# Patient Record
Sex: MALE | Race: WHITE | Employment: OTHER | URBAN - METROPOLITAN AREA
[De-identification: names, ages, dates, MRNs, and addresses within clinical notes are randomized per-mention and may not be internally consistent; named-entity substitution may affect disease eponyms.]

---

## 2017-08-16 ENCOUNTER — TELEPHONE (OUTPATIENT)
Dept: ORTHOPEDIC SURGERY | Facility: CLINIC | Age: 62
End: 2017-08-16

## 2017-08-16 ENCOUNTER — OFFICE VISIT (OUTPATIENT)
Dept: ORTHOPEDIC SURGERY | Facility: CLINIC | Age: 62
End: 2017-08-16

## 2017-08-16 VITALS
WEIGHT: 181 LBS | HEART RATE: 58 BPM | HEIGHT: 70 IN | OXYGEN SATURATION: 100 % | SYSTOLIC BLOOD PRESSURE: 138 MMHG | BODY MASS INDEX: 25.91 KG/M2 | DIASTOLIC BLOOD PRESSURE: 95 MMHG | TEMPERATURE: 95.4 F

## 2017-08-16 DIAGNOSIS — I70.90 CALCIFICATION OF MULTIPLE JOINTS AND ARTERIES: ICD-10-CM

## 2017-08-16 DIAGNOSIS — G89.29 CHRONIC RIGHT HIP PAIN: Primary | ICD-10-CM

## 2017-08-16 DIAGNOSIS — M25.551 CHRONIC RIGHT HIP PAIN: Primary | ICD-10-CM

## 2017-08-16 DIAGNOSIS — M25.80 CALCIFICATION OF MULTIPLE JOINTS AND ARTERIES: ICD-10-CM

## 2017-08-16 RX ORDER — CELECOXIB 200 MG/1
CAPSULE ORAL 2 TIMES DAILY
COMMUNITY

## 2017-08-16 RX ORDER — ROSUVASTATIN CALCIUM 5 MG/1
TABLET, COATED ORAL
COMMUNITY

## 2017-08-16 NOTE — PROGRESS NOTES
Patient: Laverne Carreon                MRN: [id number]       SSN: [id number]  YOB: 1955        AGE: 63 y.o. SEX: male  Body mass index is 25.97 kg/(m^2). PCP: No primary care provider on file. 08/16/17    HISTORY: Mr. Live Rasmussen is a retired  with end-staged arthritis involving the right hip. He is interested in a direct anterior hip replacement. He would like to run on his hip. We had a lengthy discussion. I do not recommend running on a hip replacement for implant longevity purposes. The pain is significant. He has less than a 10-minute level walking tolerance. He has pain at night and pain with activities of daily living. He is fed up and frustrated with it. He is interviewing a couple of different surgeons to make his decision, and he is interested in a direct anterior hip replacement. He likes to body build and work out. He is very active. PHYSICAL EXAMINATION:  On examination today, he walks with an antalgic gait. He holds his back a little stiffly. The left hip rotates nicely. The right hip is quite stiff, which reproduces his groin pain. The calf is nontender. Omi's sign is negative. Both feet are warm and well-perfused. There is no cyanosis or clubbing. He does have good pulse. RADIOGRAPHS:  On the x-ray, he has calcification of his arteries, and he also gives a history of two DVTs and two PEs. PLAN:  He is on Xarelto. He may be a candidate for a filter, and with his calcification in his arteries as well, and I would like him to have a vascular opinion from the point of view of the calcification in his arteries. We will get one. We did discuss risks and benefits involving joint replacement including but not limited to infection, DVT, pulmonary embolism, anesthetic complications, blood loss requiring transfusion, pain, stiffness, as well as implant longevity, leg length discrepancy, dislocation, and other complications.   All questions were answered. He is going to think about things. I am going to get a Vascular opinion, and he will return to see us. We can discuss a little bit more when he returns. REVIEW OF SYSTEMS:      CON: negative for weight loss, fever  EYE: negative for double vision  ENT: negative for hoarseness  RS:   negative for Tb  GI:    negative for blood in stool  :  negative for blood in urine  Other systems reviewed and noted below. Past Medical History:   Diagnosis Date    Arthritis     Cancer [facility]) 2013    melanoma       History reviewed. No pertinent family history. Current Outpatient Prescriptions   Medication Sig Dispense Refill    rivaroxaban (XARELTO) 20 mg tab tablet Take  by mouth daily.  rosuvastatin (CRESTOR) 5 mg tablet Take  by mouth nightly.  celecoxib (CELEBREX) 200 mg capsule Take  by mouth two (2) times a day. No Known Allergies    Past Surgical History:   Procedure Laterality Date    HX SKIN BIOPSY  2013    remove melanoma       Social History     Social History    Marital status:      Spouse name: N/A    Number of children: N/A    Years of education: N/A     Occupational History    Not on file. Social History Main Topics    Smoking status: Never Smoker    Smokeless tobacco: Never Used    Alcohol use Yes    Drug use: Not on file    Sexual activity: Not on file     Other Topics Concern    Not on file     Social History Narrative    No narrative on file       Visit Vitals    BP (!) 138/95    Pulse (!) 58    Temp 95.4 °F (35.2 °C) (Oral)    Ht 5' 10\" (1.778 m)    Wt 181 lb (82.1 kg)    SpO2 100%    BMI 25.97 kg/m2         PHYSICAL EXAMINATION:  GENERAL: Alert and oriented x3, in no acute distress, well-developed, well-nourished, afebrile. HEART: No JVD. EYES: No scleral icterus   NECK: No significant lymphadenopathy   LUNGS: No respiratory compromise or indrawing  ABDOMEN: Soft, non-tender, non-distended.            Electronically signed by: Katherine Chang MD

## 2017-08-16 NOTE — TELEPHONE ENCOUNTER
Contacted [facility]. [facility] is inquiring xrays to be faxed to them of patient that was done today. Xrays faxed at this time.

## 2017-08-16 NOTE — TELEPHONE ENCOUNTER
GREGOR FROM [facility] CALLED FOR DR. Victor M Valverde. GREGOR SAID DR. SHELTON SENT THE PATIENT OVER TO THEM ; BUT THEY CAN NOT SEE THE PATIENT XRAY IN [facility]. GREGOR SAID NURSE EVER NEEDS THE XRAY ON THE PATIENT. GREGOR SEE THEY CAN NOT SEE THE PATIENT WITHOUT THIS. GREGOR FROM [facility]  TEL. [phone]. FAX# [phone].

## 2017-08-17 NOTE — TELEPHONE ENCOUNTER
JESSICA FROM [facility] CALLED AND SAID THEY NEED THE [facility] INSURANCE REFERRAL FOR THE PATIENT. JESSICA TEL. [phone]. FAX# [phone].

## 2017-08-17 NOTE — TELEPHONE ENCOUNTER
Order was placed at [facility] office.  Request for referral forwarded to [facility].

## 2017-08-25 ENCOUNTER — OFFICE VISIT (OUTPATIENT)
Dept: VASCULAR SURGERY | Age: 62
End: 2017-08-25

## 2017-08-25 VITALS
WEIGHT: 181 LBS | DIASTOLIC BLOOD PRESSURE: 68 MMHG | BODY MASS INDEX: 25.91 KG/M2 | RESPIRATION RATE: 18 BRPM | HEIGHT: 70 IN | SYSTOLIC BLOOD PRESSURE: 122 MMHG

## 2017-08-25 DIAGNOSIS — Z86.718 HISTORY OF DVT IN ADULTHOOD: Primary | ICD-10-CM

## 2017-08-25 DIAGNOSIS — I70.90 ATHEROSCLEROSIS OF ARTERIES: ICD-10-CM

## 2017-08-25 NOTE — PROGRESS NOTES
Paulie Proctor    Chief Complaint   Patient presents with   [place] New Patient    Leg Pain       History and Physical    Mr Live Rasmussen is here at the request of orthopedics for preop vascular evaluation. He is very interested in anterior hip replacement. He has end-stage arthritis, but wants opportunity to have a good quick recovery with long-term results. He is getting the opinion of several orthopedists before he finalizes a decision for treatment. However he does have a history of DVT and pulmonary embolism. He said these were both related to what he would say was prolonged airplane travel. He does not recall which extremity DVT has occurred with each event, but with both of them did have PE. So after the second incident 2009, (first was 2007) he was recommended to continue on chronic anticoagulation and is on Xarelto. He has had no sequela of symptoms from his DVT history. He has been able to be off Xarelto for other procedures, and has even done some of the bridges with Lovenox. But there was consideration with discussion with recent orthopedic surgeon was consideration of filter as additional prevention for potential hip replacement. Another consideration for the vascular evaluation was that there were arterial calcification seen on his pelvic x-ray. He denies any symptoms of claudication he is on a statin drug for hypercholesterolemia, but has no other pertinent risk factors and has been a lifelong non-smoker. I did explain that patients can certainly have atherosclerosis or these arterial calcifications seen on x-ray but not have any effect on the perfusion status. Past Medical History:   Diagnosis Date    Arthritis     Cancer [facility]) 2013    melanoma     There is no problem list on file for this patient.     Past Surgical History:   Procedure Laterality Date    HX SKIN BIOPSY  2013    remove melanoma     Current Outpatient Prescriptions   Medication Sig Dispense Refill    rivaroxaban (Flores Mena) 20 mg tab tablet Take  by mouth daily.  rosuvastatin (CRESTOR) 5 mg tablet Take  by mouth nightly.  celecoxib (CELEBREX) 200 mg capsule Take  by mouth two (2) times a day. No Known Allergies  Social History     Social History    Marital status:      Spouse name: N/A    Number of children: N/A    Years of education: N/A     Occupational History    Not on file. Social History Main Topics    Smoking status: Never Smoker    Smokeless tobacco: Never Used    Alcohol use Yes    Drug use: Not on file    Sexual activity: Not on file     Other Topics Concern    Not on file     Social History Narrative      No family history on file. Review of Systems    Review of Systems - History obtained from the patient  General ROS: negative  Psychological ROS: negative  Ophthalmic ROS: negative  Respiratory ROS: negative  Cardiovascular ROS: negative  Gastrointestinal ROS: negative  Musculoskeletal ROS: positive for - joint pain and joint stiffness  Neurological ROS: negative  Dermatological ROS: negative  Vascular ROS: negative        Physical Exam:    Visit Vitals    /68 (BP 1 Location: Left arm, BP Patient Position: Sitting)    Resp 18    Ht 5' 10\" (1.778 m)    Wt 181 lb (82.1 kg)    BMI 25.97 kg/m2      General:  Alert, cooperative, no distress. Head:  Normocephalic, without obvious abnormality, atraumatic. Eyes:    Conjunctivae/corneas clear. Pupils equal, round, reactive to light. Extraocular movements intact. Extremities: No edema or signs of venous insufficiency. No varicosities   Pulses: Biphasic with handheld doppler but I could not augment signals well to get measurable lupe's using handheld/manual devices in office   Skin: No signs of cellulitis, no venous pigmentation,no ischemic changes       Impression and Plan:  1. History of DVT in adulthood    2.  Atherosclerosis of arteries      Orders Placed This Encounter    DUPLEX LOWER EXT VENOUS BILAT AMB    LUPE BILATERAL We had a lengthy discussion today about his DVT/PE history. I discussed the consideration for filter as a means for additional prophylaxis given high risk of thrombotic events with orthopedic surgeries. He would generally be at a higher risk given his prior history. It certainly reasonable to simply anticoagulate, with the medication of choice within the comfort level of the orthopedist.  He questions whether the decision for filter could be made by getting a preop ultrasound, which is certainly reasonable. We did go ahead and arrange for that, and I will call him with the results of that so he can then arrange follow-up with Ortho. As for arterial status, since I could not obtain sufficient signals for ABIs I will ask that when he gets the venous study, that we do get documentation of his arterial perfusion. So that will be included in the phone call discussion as well    KATERINA Abreu    Portions of this note have been created using voice recognition software.

## 2017-09-05 ENCOUNTER — OFFICE VISIT (OUTPATIENT)
Dept: VASCULAR SURGERY | Age: 62
End: 2017-09-05

## 2017-09-05 DIAGNOSIS — Z86.718 HISTORY OF DVT IN ADULTHOOD: ICD-10-CM

## 2017-09-05 NOTE — PROCEDURES
[facility]   *** FINAL REPORT ***    Name: Alfredito Thomas  MRN: [id number]       Outpatient  : 1955  HIS Order #: [id number]  [address] [facility] Visit #: [id number]  Date: 05 Sep 2017    TYPE OF TEST: Peripheral Venous Testing    REASON FOR TEST  H/O DVT    Right Leg:-  Deep venous thrombosis:           No  Superficial venous thrombosis:    Yes  Deep venous insufficiency:        Not examined  Superficial venous insufficiency: Not examined    Left Leg:-  Deep venous thrombosis:           No  Superficial venous thrombosis:    Yes  Deep venous insufficiency:        Not examined  Superficial venous insufficiency: Not examined      INTERPRETATION/FINDINGS  Duplex images were obtained using 2-D gray scale, color flow and  spectral doppler analysis. 1. No evidence of deep venous thrombosis identified in the common  femoral, femoral, profunda, popliteal, posterior tibial or peroneal  veins bilaterally. Deep venous reflux noted in the left popliteal  vein (2.1sec) and 1 of 2 left posterior tibial vein (0.8 sec). 2. Evidence of chronic superficial venous thrombus in the bilateral  lesser saphenous veins in the popliteal fossa level. 3. Triphasic doppler signals in the tibial vessel at rest.  No prior study. ADDITIONAL COMMENTS    I have personally reviewed the data relevant to the interpretation of  this  study. TECHNOLOGIST: Cristobal Aragon RVT, BS  Signed: 2017 01:19 PM    PHYSICIAN: Sheldon Loo MD  Signed: 2017 02:19 PM

## 2017-09-06 ENCOUNTER — OFFICE VISIT (OUTPATIENT)
Dept: VASCULAR SURGERY | Age: 62
End: 2017-09-06

## 2017-09-06 ENCOUNTER — DOCUMENTATION ONLY (OUTPATIENT)
Dept: VASCULAR SURGERY | Age: 62
End: 2017-09-06

## 2017-09-06 DIAGNOSIS — I70.90 ATHEROSCLEROSIS OF ARTERIES: ICD-10-CM

## 2017-09-06 NOTE — PROCEDURES
[facility]   *** FINAL REPORT ***    Name: Daphne Ann  MRN: [id number]       Outpatient  : 1955  HIS Order #: [id number]  [address] [facility] Visit #: [id number]  Date: 06 Sep 2017    TYPE OF TEST: Peripheral Arterial Testing    REASON FOR TEST  Peripheral vascular dz NOS    Right Leg  Segmentals: Normal                     mmHg  Brachial         148  High thigh  Low thigh  Calf  Posterior tibial 255  Dorsalis pedis   167  Peroneal  Metatarsal        60  Toe pressure  Doppler:    Normal  Ankle/Brachial: 1.72    Left Leg  Segmentals: Normal                     mmHg  Brachial         144  High thigh  Low thigh  Calf  Posterior tibial 197  Dorsalis pedis   181  Peroneal  Metatarsal        85  Toe pressure  Doppler:    Normal  Ankle/Brachial: 1.33    INTERPRETATION/FINDINGS  Physiologic testing was performed using continuous wave doppler and  segmental pressures. LUPE only:  1. No evidence of significant peripheral arterial disease at rest in  the right leg. 2. No evidence of significant peripheral arterial disease at rest in  the left leg. 3. The right ankle/brachial index is 1.72 and the left ankle/brachial  index is 1.33 consistent with arterial calcification. 4. The digital/brachial index is 0.41 on the right and 0.57 on the  left. Digital waveforms are dampened bilaterally. No prior to compare. ADDITIONAL COMMENTS    I have personally reviewed the data relevant to the interpretation of  this  study. TECHNOLOGIST: Joanne Salazar RDMS  Signed: 2017 01:24 PM    PHYSICIAN: Alireza King.  Tea Araiza MD  Signed: 2017 02:22 PM

## 2017-09-06 NOTE — PROGRESS NOTES
Called patient with vascular results  Arterial study suggested some calcified vessels rendering a falsely elevated freda, but flow was WNL and therefore noted as no evidence of peripheral arterial disease in either leg    Vein study was negative for any acute or chronic DVT  There were signs of chronic \"phlebitis\" of the small saphenous veins bilaterally    He is on chronic xarelto due to his history of dvt/pe    As for consideration of hip replacement, retrievable filter is still an option, but can also just do his xarelto and stop one day pre op and resume day one post op  Depending on the surgeons preferences, could stop xarelto a week prior and do a lovenox bridge and resume the xarelto when cleared by ortho    These will be considerations he will contemplate as he follows up with his orthopedic surgeon

## 2017-10-20 ENCOUNTER — OFFICE VISIT (OUTPATIENT)
Dept: ORTHOPEDIC SURGERY | Age: 62
End: 2017-10-20

## 2017-10-20 VITALS
OXYGEN SATURATION: 98 % | SYSTOLIC BLOOD PRESSURE: 131 MMHG | BODY MASS INDEX: 25.77 KG/M2 | DIASTOLIC BLOOD PRESSURE: 85 MMHG | WEIGHT: 180 LBS | HEART RATE: 51 BPM | HEIGHT: 70 IN | TEMPERATURE: 95.9 F | RESPIRATION RATE: 14 BRPM

## 2017-10-20 DIAGNOSIS — M16.11 PRIMARY OSTEOARTHRITIS OF RIGHT HIP: Primary | ICD-10-CM

## 2017-11-17 ENCOUNTER — HOSPITAL ENCOUNTER (OUTPATIENT)
Dept: GENERAL RADIOLOGY | Age: 62
Discharge: HOME | End: 2017-11-17
Attending: ORTHOPAEDIC SURGERY
Payer: OTHER GOVERNMENT

## 2017-11-17 DIAGNOSIS — M16.11 PRIMARY OSTEOARTHRITIS OF RIGHT HIP: ICD-10-CM

## 2017-11-17 PROCEDURE — 77002 NEEDLE LOCALIZATION BY XRAY: CPT

## 2017-11-17 PROCEDURE — 74011250636 HC RX REV CODE- 250/636: Performed by: ORTHOPAEDIC SURGERY

## 2017-11-17 PROCEDURE — 27093 INJECTION FOR HIP X-RAY: CPT

## 2017-11-17 PROCEDURE — 74011000250 HC RX REV CODE- 250: Performed by: ORTHOPAEDIC SURGERY

## 2017-11-17 PROCEDURE — 20610 DRAIN/INJ JOINT/BURSA W/O US: CPT

## 2017-11-17 PROCEDURE — 74011636320 HC RX REV CODE- 636/320: Performed by: ORTHOPAEDIC SURGERY

## 2017-11-17 RX ORDER — METHYLPREDNISOLONE ACETATE 40 MG/ML
40 INJECTION, SUSPENSION INTRA-ARTICULAR; INTRALESIONAL; INTRAMUSCULAR; SOFT TISSUE
Status: COMPLETED | OUTPATIENT
Start: 2017-11-17 | End: 2017-11-17

## 2017-11-17 RX ORDER — LIDOCAINE HYDROCHLORIDE 10 MG/ML
30 INJECTION, SOLUTION EPIDURAL; INFILTRATION; INTRACAUDAL; PERINEURAL ONCE
Status: COMPLETED | OUTPATIENT
Start: 2017-11-17 | End: 2017-11-17

## 2017-11-17 RX ADMIN — IOPAMIDOL 1 ML: 408 INJECTION, SOLUTION INTRATHECAL at 13:55

## 2017-11-17 RX ADMIN — METHYLPREDNISOLONE ACETATE 40 MG: 40 INJECTION, SUSPENSION INTRA-ARTICULAR; INTRALESIONAL; INTRAMUSCULAR; SOFT TISSUE at 13:55

## 2017-11-17 RX ADMIN — LIDOCAINE HYDROCHLORIDE 7 ML: 10 INJECTION, SOLUTION EPIDURAL; INFILTRATION; INTRACAUDAL; PERINEURAL at 13:55

## 2018-01-25 ENCOUNTER — OFFICE VISIT (OUTPATIENT)
Dept: ORTHOPEDIC SURGERY | Age: 63
End: 2018-01-25

## 2018-01-25 VITALS
HEART RATE: 47 BPM | WEIGHT: 182.2 LBS | HEIGHT: 70 IN | DIASTOLIC BLOOD PRESSURE: 81 MMHG | BODY MASS INDEX: 26.08 KG/M2 | SYSTOLIC BLOOD PRESSURE: 127 MMHG | OXYGEN SATURATION: 100 %

## 2018-01-25 DIAGNOSIS — M16.11 PRIMARY OSTEOARTHRITIS OF RIGHT HIP: Primary | ICD-10-CM

## 2018-01-25 RX ORDER — TRIAMCINOLONE ACETONIDE 40 MG/ML
40 INJECTION, SUSPENSION INTRA-ARTICULAR; INTRAMUSCULAR ONCE
Qty: 2 ML | Refills: 0 | Status: CANCELLED
Start: 2018-01-25 | End: 2018-01-25

## 2018-01-25 RX ORDER — LIDOCAINE HYDROCHLORIDE 10 MG/ML
6 INJECTION INFILTRATION; PERINEURAL ONCE
Qty: 6 ML | Refills: 0 | Status: CANCELLED
Start: 2018-01-25 | End: 2018-01-25

## 2018-01-25 NOTE — PROGRESS NOTES
[address] [place] [address] [id number]  [phone]           Patient: Xenia Reyes                MRN: [id number]       SSN: [id number]  YOB: 1955        AGE: 63 y.o. SEX: male  Body mass index is 26.14 kg/(m^2). PCP: Raza Watson MD  01/25/18      This office note has been dictated. REVIEW OF SYSTEMS:  Constitutional: Negative for fever, chills, weight loss and malaise/fatigue. HENT: Negative. Eyes: Negative. Respiratory: Negative. Cardiovascular: Negative. Gastrointestinal: No bowel incontinence or constipation. Genitourinary: No bladder incontinence or saddle anesthesia. Skin: Negative. Neurological: Negative. Endo/Heme/Allergies: Negative. Psychiatric/Behavioral: Negative. Musculoskeletal: As per HPI above. Past Medical History:   Diagnosis Date    Arthritis     Cancer ([address] 2013    melanoma         Current Outpatient Prescriptions:     rivaroxaban (XARELTO) 20 mg tab tablet, Take  by mouth daily. , Disp: , Rfl:     rosuvastatin (CRESTOR) 5 mg tablet, Take  by mouth nightly., Disp: , Rfl:     celecoxib (CELEBREX) 200 mg capsule, Take  by mouth two (2) times a day., Disp: , Rfl:     No Known Allergies    Social History     Social History    Marital status:      Spouse name: N/A    Number of children: N/A    Years of education: N/A     Occupational History    Not on file. Social History Main Topics    Smoking status: Never Smoker    Smokeless tobacco: Never Used    Alcohol use Yes    Drug use: Not on file    Sexual activity: Not on file     Other Topics Concern    Not on file     Social History Narrative       Past Surgical History:   Procedure Laterality Date    HX SKIN BIOPSY  2013    remove melanoma             SUBJECTIVE:  The patient was seen today with regards to his right hip. The patient has known advanced arthritis of the right hip.   He is not quite ready for surgical intervention yet. He had an intraarticular injection in November, which lasted about two months. He would like a repeat injection for the hip. He has had no recent fever, chills, systemic changes, or injuries to report, and no chest pain or shortness of breath. PHYSICAL EXAMINATION:  In general, the patient is alert and oriented x 3 in no acute distress. The patient is well-developed, well-nourished, with a normal affect. The patient is afebrile. HEENT:  Head is normocephalic and atraumatic. Pupils are equally round and reactive to light and accommodation. Extraocular eye movements are intact. Neck is supple. Trachea is midline. No JVD is present. Breathing is nonlabored. Examination of the lower extremities reveals decreased range of motion pain-free range of motion of the right hip reproducing groin and thigh discomfort. There is no pain to palpation of the greater trochanteric bursae. There is negative straight leg raise. There is negative calf tenderness. There is negative Omis. There is no evidence of DVT present. RADIOGRAPHS:  Radiographs in the office, including AP of the pelvis and AP and cross table of the right hip shows advanced arthritis with bone-to-bone eburnation. No fracture or dislocation is seen. ASSESSMENT:  Right hip end-staged osteoarthritis. PLAN:  At this point, we will get him set up for repeat intraarticular injection to the right hip. We can have this in the first week of February. We will see him back in the office afterwards for reevaluation. She will call with any questions or concerns that shall arise.                    JR Yosvany BACK, EMMA, ATC

## 2018-02-09 ENCOUNTER — HOSPITAL ENCOUNTER (OUTPATIENT)
Dept: GENERAL RADIOLOGY | Age: 63
Discharge: HOME | End: 2018-02-09
Attending: PHYSICIAN ASSISTANT
Payer: OTHER GOVERNMENT

## 2018-02-09 DIAGNOSIS — M16.11 PRIMARY OSTEOARTHRITIS OF RIGHT HIP: ICD-10-CM

## 2018-02-09 PROCEDURE — 74011000250 HC RX REV CODE- 250: Performed by: PHYSICIAN ASSISTANT

## 2018-02-09 PROCEDURE — 74011250636 HC RX REV CODE- 250/636: Performed by: PHYSICIAN ASSISTANT

## 2018-02-09 PROCEDURE — 74011636320 HC RX REV CODE- 636/320: Performed by: PHYSICIAN ASSISTANT

## 2018-02-09 PROCEDURE — 77002 NEEDLE LOCALIZATION BY XRAY: CPT

## 2018-02-09 RX ORDER — METHYLPREDNISOLONE ACETATE 40 MG/ML
40 INJECTION, SUSPENSION INTRA-ARTICULAR; INTRALESIONAL; INTRAMUSCULAR; SOFT TISSUE
Status: CANCELLED | OUTPATIENT
Start: 2018-02-09 | End: 2018-02-09

## 2018-02-09 RX ORDER — LIDOCAINE HYDROCHLORIDE 10 MG/ML
30 INJECTION, SOLUTION EPIDURAL; INFILTRATION; INTRACAUDAL; PERINEURAL ONCE
Status: COMPLETED | OUTPATIENT
Start: 2018-02-09 | End: 2018-02-09

## 2018-02-09 RX ORDER — LIDOCAINE HYDROCHLORIDE 10 MG/ML
30 INJECTION, SOLUTION EPIDURAL; INFILTRATION; INTRACAUDAL; PERINEURAL
Status: CANCELLED | OUTPATIENT
Start: 2018-02-09 | End: 2018-02-09

## 2018-02-09 RX ORDER — METHYLPREDNISOLONE ACETATE 40 MG/ML
40 INJECTION, SUSPENSION INTRA-ARTICULAR; INTRALESIONAL; INTRAMUSCULAR; SOFT TISSUE
Status: COMPLETED | OUTPATIENT
Start: 2018-02-09 | End: 2018-02-09

## 2018-02-09 RX ADMIN — IOPAMIDOL 1 ML: 408 INJECTION, SOLUTION INTRATHECAL at 13:00

## 2018-02-09 RX ADMIN — LIDOCAINE HYDROCHLORIDE 10 ML: 10 INJECTION, SOLUTION EPIDURAL; INFILTRATION; INTRACAUDAL; PERINEURAL at 13:00

## 2018-02-09 RX ADMIN — METHYLPREDNISOLONE ACETATE 40 MG: 40 INJECTION, SUSPENSION INTRA-ARTICULAR; INTRALESIONAL; INTRAMUSCULAR; SOFT TISSUE at 13:00
